# Patient Record
(demographics unavailable — no encounter records)

---

## 2025-07-21 NOTE — DISCUSSION/SUMMARY
[de-identified] : 77 y/o male presenting with bilateral shoulder pain x 1 month after pulling a garden hose I personally reviewed right shoulder xrays today which demonstrate moderate arthritis  Advised patient to take anti inflammatory medicine for pain relief   With regards to left shoulder I personally reviewed xrays today which demonstrate moderate arthritis  Patient does have weakness on exam today concerning for probable full-thickness rotator cuff tear. Patient was treated with subacromial injection for diagnostic and therapeutic purposes  Patient was provided PT prescription to work on range of motion and strengthening. If patient fails to make any meaningful progress we can consider MRI.  With respect to his right shoulder patient will continue with PT.    Recommended a Medrol Dosepak and discussed the dosing schedule and side effects of prednisone. We also recommend the patient contact their primary care physician to discuss any potential interactions with their current medications or health issues.    Attestation: I, Yuli Veloz, attest that this documentation has been prepared under the direction and in the presence of Provider Giovanni Solorzano MD.  The documentation recorded by the scribe, in my presence, accurately reflects the service I personally performed, and the decisions made by me with my edits as appropriate. Giovanni Solorzano MD

## 2025-07-21 NOTE — PROCEDURE
[FreeTextEntry3] : Large Joint Injection was performed because of pain and inflammation.   Anesthesia: ethyl chloride sprayed topically..   Kenalog: An injection of Kenalog 40 mg , 1 cc.   Lidocaine: 7 cc.   Medication was injected in the left subacromial space. Patient has tried OTC's including aspirin, Ibuprofen, Aleve etc or prescription NSAIDS, and/or exercises at home and/ or physical therapy without satisfactory response and Patient has decreased mobility in the joint. After verbal consent using sterile preparation and technique. The risks, benefits, and alternatives to cortisone injection were explained in full to the patient. Risks outlined include but are not limited to infection, sepsis, bleeding, scarring, skin discoloration, temporary increase in pain, syncopal episode, failure to resolve symptoms, allergic reaction, symptom recurrence, and elevation of blood sugar in diabetics. Patient understood the risks. All questions were answered. After discussion of options, patient requested an injection. Oral informed consent was obtained and sterile prep was done of the injection site. Sterile technique was utilized for the procedure including the preparation of the solutions used for the injection. Patient tolerated the procedure well. Advised to ice the injection site this evening. Prep with alcohol locally to site. Sterile technique used. Patient tolerated procedure well. Post Procedure Instructions: Patient was advised to call if redness, pain, or fever occur and apply ice for 15 min. out of every hour for the next 12-24 hours as tolerated. patient was advised to rest the joint(s) for 7 days.

## 2025-07-21 NOTE — PHYSICAL EXAM
[Left] : left shoulder [Right] : right shoulder [Type 2 acromion] : Type 2 acromion [Glenohumeral arthritis] : Glenohumeral arthritis [No bony abnormalities] : No bony abnormalities [de-identified] : Constitutional: The general appearance of the patient is well developed, well nourished, no deformities and well groomed. Normal  Gait: Gait and function is as follows: normal gait.  Skin: Head and neck visualized skin is normal. Left upper extremity visualized skin is normal. Right upper extremity visualized skin is normal. Thoracic Skin of the thoracic spine shows visualized skin is normal.  Cardiovascular: palpable radial pulse bilaterally, good capillary refill in digits of the bilateral upper extremities and no temperature or color changes in the bilateral upper extremities.  Lymphatic: Normal Palpation of lymph nodes in the cervical.  Neurologic: fine motor control in the bilateral upper extremities is intact. Deep Tendon Reflexes in Upper and Lower Extremities Negative Grossman's in the bilateral upper extremities. The patient is oriented to time, place and person. Sensation to light touch intact in the bilateral upper extremities. Mood and Affect is normal.  Right Shoulder: Inspection of the shoulder/upper arm is as follows: no scapula winging, no biceps deformity and no AC joint deformity. Palpation of the shoulder/upper arm is as follows: There is tenderness at the proximal biceps tendon. Range of motion of the shoulder is as follows: Pain with internal rotation, external rotation, abduction and forward flexion. Strength of the shoulder is as follows: Supraspinatus 4/5. External Rotation 4/5. Internal Rotation 4/5. Infraspinatus 5/5 4/5. Deltoid 5/5 Ligament Stability and Special Tests of the shoulder is as follows: Neer test is positive. Frost' test is positive.  Left Shoulder: Inspection of the shoulder/upper arm is as follows: no scapula winging, no biceps deformity and no AC joint deformity. Palpation of the shoulder/upper arm is as follows: There is tenderness at the proximal biceps tendon. Range of motion of the shoulder is as follows: Pain with internal rotation, external rotation, abduction and forward flexion. Strength of the shoulder is as follows: Supraspinatus 4/5. External Rotation 4/5. Internal Rotation 4/5. Infraspinatus 5/5 4/5. Deltoid 5/5 Ligament Stability and Special Tests of the shoulder is as follows: Neer test is positive. Frost' test is positive.  Neck:  Inspection / Palpation of the cervical spine is as follows: mild paracervical tenderness. Range of motion of the cervical spine is as follows: moderately decreased range of motion of the cervical spine. Stability testing for the cervical spine is as follows Stable range of motion.  Back, including spine: Inspection / Palpation of the thoracic/lumbar spine is as follows: There is a full, pain free, stable range of motion of the thoracic spine with a normal tone and not tenderness to palpation..

## 2025-07-21 NOTE — PHYSICAL EXAM
[Left] : left shoulder [Right] : right shoulder [Type 2 acromion] : Type 2 acromion [Glenohumeral arthritis] : Glenohumeral arthritis [No bony abnormalities] : No bony abnormalities [de-identified] : Constitutional: The general appearance of the patient is well developed, well nourished, no deformities and well groomed. Normal  Gait: Gait and function is as follows: normal gait.  Skin: Head and neck visualized skin is normal. Left upper extremity visualized skin is normal. Right upper extremity visualized skin is normal. Thoracic Skin of the thoracic spine shows visualized skin is normal.  Cardiovascular: palpable radial pulse bilaterally, good capillary refill in digits of the bilateral upper extremities and no temperature or color changes in the bilateral upper extremities.  Lymphatic: Normal Palpation of lymph nodes in the cervical.  Neurologic: fine motor control in the bilateral upper extremities is intact. Deep Tendon Reflexes in Upper and Lower Extremities Negative Grossman's in the bilateral upper extremities. The patient is oriented to time, place and person. Sensation to light touch intact in the bilateral upper extremities. Mood and Affect is normal.  Right Shoulder: Inspection of the shoulder/upper arm is as follows: no scapula winging, no biceps deformity and no AC joint deformity. Palpation of the shoulder/upper arm is as follows: There is tenderness at the proximal biceps tendon. Range of motion of the shoulder is as follows: Pain with internal rotation, external rotation, abduction and forward flexion. Strength of the shoulder is as follows: Supraspinatus 4/5. External Rotation 4/5. Internal Rotation 4/5. Infraspinatus 5/5 4/5. Deltoid 5/5 Ligament Stability and Special Tests of the shoulder is as follows: Neer test is positive. Frost' test is positive.  Left Shoulder: Inspection of the shoulder/upper arm is as follows: no scapula winging, no biceps deformity and no AC joint deformity. Palpation of the shoulder/upper arm is as follows: There is tenderness at the proximal biceps tendon. Range of motion of the shoulder is as follows: Pain with internal rotation, external rotation, abduction and forward flexion. Strength of the shoulder is as follows: Supraspinatus 4/5. External Rotation 4/5. Internal Rotation 4/5. Infraspinatus 5/5 4/5. Deltoid 5/5 Ligament Stability and Special Tests of the shoulder is as follows: Neer test is positive. Frost' test is positive.  Neck:  Inspection / Palpation of the cervical spine is as follows: mild paracervical tenderness. Range of motion of the cervical spine is as follows: moderately decreased range of motion of the cervical spine. Stability testing for the cervical spine is as follows Stable range of motion.  Back, including spine: Inspection / Palpation of the thoracic/lumbar spine is as follows: There is a full, pain free, stable range of motion of the thoracic spine with a normal tone and not tenderness to palpation..

## 2025-07-21 NOTE — HISTORY OF PRESENT ILLNESS
[de-identified] : 76 y/o male presenting with right shoulder pain x 2 weeks. Patient states he fell directly onto his right shoulder after slipping in the road. He was initially unable to lift his RUE. ROM has slightly improved, patient continues to have pain when elevating his RUE above shoulder height. Pain is in the lateral aspect of his shoulder. He states intermittent clicking and popping with ROM. Pain is worse at night. Denies any numbness or tingling.  12/23/22: patient presents today for follow up of right shoulder pain. Previous subacromial injection provided significant relief. He reports persistent anterior pain that is worse with motion behind his back.  2/6/23: Patient presents today for repeat evaluation of right shoulder pain. He states initial injection provided transient relief. He has been completing PT/HEP with no improvement. He has been applying voltaren gel with no significant relief. Patient continues to note anterior and lateral pain.  2/27/23: Patient is presenting with MRI for review. He states his pain has not improved since his last visit.  4/10/23 Pt returns for follow up of right shoulder pain. Previous subacromial injection provided significant relief. He reports steady improvement with therapy as well.   7/21/25: Patient reports doing yard work in June and pulled a garden hose which exacerbated his bilateral shoulder pain. He reports his left shoulder is worse than the right. Pain is constant and localized to the posterior and lateral shoulder.  He has noticed worsening weakness to his left shoulder.  Patient does have history of medium full-thickness rotator cuff tear to right shoulder

## 2025-07-21 NOTE — DISCUSSION/SUMMARY
[de-identified] : 79 y/o male presenting with bilateral shoulder pain x 1 month after pulling a garden hose I personally reviewed right shoulder xrays today which demonstrate moderate arthritis  Advised patient to take anti inflammatory medicine for pain relief   With regards to left shoulder I personally reviewed xrays today which demonstrate moderate arthritis  Patient does have weakness on exam today concerning for probable full-thickness rotator cuff tear. Patient was treated with subacromial injection for diagnostic and therapeutic purposes  Patient was provided PT prescription to work on range of motion and strengthening. If patient fails to make any meaningful progress we can consider MRI.  With respect to his right shoulder patient will continue with PT.    Recommended a Medrol Dosepak and discussed the dosing schedule and side effects of prednisone. We also recommend the patient contact their primary care physician to discuss any potential interactions with their current medications or health issues.    Attestation: I, Yuli Veloz, attest that this documentation has been prepared under the direction and in the presence of Provider Giovanni Solorzano MD.  The documentation recorded by the scribe, in my presence, accurately reflects the service I personally performed, and the decisions made by me with my edits as appropriate. Giovanni Solorzano MD

## 2025-07-21 NOTE — ASSESSMENT
[FreeTextEntry1] : MRI right shoulder 2/25/23 [facility]:\par  \par  Complete full-thickness tear of supraspinatus tendon from the greater\par  tuberosity. Torn fibers are markedly frayed and retracted 1.8 cm.\par  \par  Moderate infraspinatus tendinosis.\par  \par  Mild to moderate long head of the biceps tendinosis.\par  \par  Shallow tearing of the superior and posterior superior labrum.\par  \par  Moderate AC joint arthrosis and subacromial/subdeltoid bursitis.\par

## 2025-07-21 NOTE — HISTORY OF PRESENT ILLNESS
[de-identified] : 76 y/o male presenting with right shoulder pain x 2 weeks. Patient states he fell directly onto his right shoulder after slipping in the road. He was initially unable to lift his RUE. ROM has slightly improved, patient continues to have pain when elevating his RUE above shoulder height. Pain is in the lateral aspect of his shoulder. He states intermittent clicking and popping with ROM. Pain is worse at night. Denies any numbness or tingling.  12/23/22: patient presents today for follow up of right shoulder pain. Previous subacromial injection provided significant relief. He reports persistent anterior pain that is worse with motion behind his back.  2/6/23: Patient presents today for repeat evaluation of right shoulder pain. He states initial injection provided transient relief. He has been completing PT/HEP with no improvement. He has been applying voltaren gel with no significant relief. Patient continues to note anterior and lateral pain.  2/27/23: Patient is presenting with MRI for review. He states his pain has not improved since his last visit.  4/10/23 Pt returns for follow up of right shoulder pain. Previous subacromial injection provided significant relief. He reports steady improvement with therapy as well.   7/21/25: Patient reports doing yard work in June and pulled a garden hose which exacerbated his bilateral shoulder pain. He reports his left shoulder is worse than the right. Pain is constant and localized to the posterior and lateral shoulder.  He has noticed worsening weakness to his left shoulder.  Patient does have history of medium full-thickness rotator cuff tear to right shoulder